# Patient Record
Sex: FEMALE | Race: WHITE | URBAN - METROPOLITAN AREA
[De-identification: names, ages, dates, MRNs, and addresses within clinical notes are randomized per-mention and may not be internally consistent; named-entity substitution may affect disease eponyms.]

---
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
## 2025-01-13 ENCOUNTER — TELEPHONE (OUTPATIENT)
Dept: ALLERGY | Facility: OTHER | Age: 20
End: 2025-01-13

## 2025-01-13 NOTE — TELEPHONE ENCOUNTER
Online Appointment Request     [facility] Call Center  Phone Message        Reason for Call: Appointment Intake     Patients Requests:  Reason for appointment  I've been breaking out in hives for the past three weeks and experiencing chest pain with trouble breathing. I recently on 12/31/2024 went to ER due to a severe hive outbreak and trouble breathing. I was given Dexamethasone to help but it didn't seem to help much at all as I still have the same issues.   Preferred Provider  Asad Rios  Preferred Location  [facility]  Preferred Visit Type  In-person    !! Please verify patient address and phone number.       Action taken: Other: none; Documenting patient was called; detail message was left for patient to call back.

## (undated) DEVICE — SOL WATER IRRIG 1000ML BOTTLE 2F7114

## (undated) DEVICE — KIT CONNECTOR FOR OLYMPUS ENDOSCOPES DEFENDO 100310

## (undated) DEVICE — ENDO FORCEP ENDOJAW BIOPSY 2.8MMX230CM FB-220U

## (undated) DEVICE — ENDO TUBING W/CAP AUXILARY WATER INLET 100609 EGA-500

## (undated) DEVICE — TUBING SUCTION MEDI-VAC 1/4"X20' N620A

## (undated) DEVICE — KIT ENDO TURNOVER/PROCEDURE CARRY-ON 101822

## (undated) DEVICE — ENDO BITE BLOCK PEDS BATRIK LATEX FREE B1